# Patient Record
Sex: FEMALE
[De-identification: names, ages, dates, MRNs, and addresses within clinical notes are randomized per-mention and may not be internally consistent; named-entity substitution may affect disease eponyms.]

---

## 2017-10-03 ENCOUNTER — HOSPITAL ENCOUNTER (OUTPATIENT)
Dept: HOSPITAL 92 - TBSIIMAG | Age: 62
Discharge: HOME | End: 2017-10-03
Payer: COMMERCIAL

## 2017-10-03 DIAGNOSIS — R93.7: ICD-10-CM

## 2017-10-03 DIAGNOSIS — M51.26: ICD-10-CM

## 2017-10-03 DIAGNOSIS — M48.061: ICD-10-CM

## 2017-10-03 DIAGNOSIS — M54.9: Primary | ICD-10-CM

## 2017-10-03 PROCEDURE — 72148 MRI LUMBAR SPINE W/O DYE: CPT

## 2017-10-03 NOTE — MRI
NONCONTRAST LUMBAR SPINE MRI:

 

INDICATIONS:

Back pain.  Central canal stenosis.  Followup.

 

COMPARISON:

07/05/2012

 

FINDINGS:

Vertebral body heights maintain stable height and alignment.  Slight reversal of normal lumbar lordo
sis.  No acute marrow edema.  There is mild retrolisthesis at the L3-L4 level, stable.  There is deg
enerative disk signal involving L2-L3, L3-L4, and L5-S1.  The conus medullaris terminates at the L1 
level, normal in morphology.

 

L5-S1:  Mild disk bulge without central canal or foraminal stenosis.

 

L4-L5:  Broad-based disk bulge remains, producing mild to moderate central canal stenosis and crowdi
ng of the bilateral traversing L5 nerve roots.  No high grade left foraminal stenosis.  There is min
imal narrowing of the right neural foramen.

 

L3-L4:  There is a disk bulge with a superimposed central disk protrusion again demonstrated, with s
evere central canal stenosis, progressed from prior exam.  There is mild bilateral neural foraminal 
stenosis.

 

L2-L3:  Broad-based disk bulge with mild narrowing of the central canal.  No high grade foraminal st
enosis.

 

L1-L2:  No high grade central canal or neural foraminal stenosis.

 

There is multilevel mild to moderate bilateral facet osteoarthritis.

 

IMPRESSION:

Progression of central canal stenosis, now severe in degree, involving the L3-L4 level, due to centr
al disk protrusion, superimposed upon broad-based disk bulge.

 

POS: [facility]

## 2017-12-20 ENCOUNTER — HOSPITAL ENCOUNTER (OUTPATIENT)
Dept: HOSPITAL 92 - BICMAMMO | Age: 62
Discharge: HOME | End: 2017-12-20
Attending: NURSE PRACTITIONER
Payer: COMMERCIAL

## 2017-12-20 DIAGNOSIS — Z12.31: Primary | ICD-10-CM

## 2017-12-20 PROCEDURE — G0202 SCR MAMMO BI INCL CAD: HCPCS

## 2017-12-20 PROCEDURE — 77067 SCR MAMMO BI INCL CAD: CPT

## 2017-12-28 ENCOUNTER — HOSPITAL ENCOUNTER (OUTPATIENT)
Dept: HOSPITAL 92 - ULT | Age: 62
Discharge: HOME | End: 2017-12-28
Attending: INTERNAL MEDICINE
Payer: COMMERCIAL

## 2017-12-28 DIAGNOSIS — K76.0: ICD-10-CM

## 2017-12-28 DIAGNOSIS — K74.60: Primary | ICD-10-CM

## 2017-12-28 DIAGNOSIS — I70.90: ICD-10-CM

## 2017-12-28 DIAGNOSIS — M81.0: ICD-10-CM

## 2017-12-28 DIAGNOSIS — M47.816: ICD-10-CM

## 2017-12-28 LAB
ALP SERPL-CCNC: 191 U/L (ref 40–150)
ALT SERPL W P-5'-P-CCNC: 14 U/L (ref 8–55)
ANION GAP SERPL CALC-SCNC: 9 MMOL/L (ref 10–20)
APTT PPP: 32 SEC (ref 22.9–36.1)
AST SERPL-CCNC: 30 U/L (ref 5–34)
BASOPHILS # BLD AUTO: 0 THOU/UL (ref 0–0.2)
BASOPHILS NFR BLD AUTO: 0.6 % (ref 0–1)
BILIRUB DIRECT SERPL-MCNC: 0.4 MG/DL (ref 0.1–0.3)
BILIRUB SERPL-MCNC: 0.9 MG/DL (ref 0.2–1.2)
BUN SERPL-MCNC: 8 MG/DL (ref 9.8–20.1)
CALCIUM SERPL-MCNC: 9.3 MG/DL (ref 7.8–10.44)
CHLORIDE SERPL-SCNC: 111 MMOL/L (ref 98–107)
CO2 SERPL-SCNC: 25 MMOL/L (ref 23–31)
CREAT CL PREDICTED SERPL C-G-VRATE: 0 ML/MIN (ref 70–130)
EOSINOPHIL # BLD AUTO: 0.2 THOU/UL (ref 0–0.7)
EOSINOPHIL NFR BLD AUTO: 3.5 % (ref 0–10)
HCT VFR BLD CALC: 43 % (ref 36–47)
LYMPHOCYTES # BLD: 2 THOU/UL (ref 1.2–3.4)
LYMPHOCYTES NFR BLD AUTO: 41.6 % (ref 21–51)
MONOCYTES # BLD AUTO: 0.3 THOU/UL (ref 0.11–0.59)
MONOCYTES NFR BLD AUTO: 5.5 % (ref 0–10)
NEUTROPHILS # BLD AUTO: 2.4 THOU/UL (ref 1.4–6.5)
PROTHROMBIN TIME: 15.1 SEC (ref 12–14.7)
RBC # BLD AUTO: 4.64 MILL/UL (ref 4.2–5.4)
WBC # BLD AUTO: 4.9 THOU/UL (ref 4.8–10.8)

## 2017-12-28 PROCEDURE — 85610 PROTHROMBIN TIME: CPT

## 2017-12-28 PROCEDURE — 72120 X-RAY BEND ONLY L-S SPINE: CPT

## 2017-12-28 PROCEDURE — 36415 COLL VENOUS BLD VENIPUNCTURE: CPT

## 2017-12-28 PROCEDURE — 80076 HEPATIC FUNCTION PANEL: CPT

## 2017-12-28 PROCEDURE — 76705 ECHO EXAM OF ABDOMEN: CPT

## 2017-12-28 PROCEDURE — 80048 BASIC METABOLIC PNL TOTAL CA: CPT

## 2017-12-28 PROCEDURE — 82105 ALPHA-FETOPROTEIN SERUM: CPT

## 2017-12-28 PROCEDURE — 85730 THROMBOPLASTIN TIME PARTIAL: CPT

## 2017-12-28 PROCEDURE — 85025 COMPLETE CBC W/AUTO DIFF WBC: CPT

## 2017-12-28 NOTE — ULT
RIGHT UPPER QUADRANT ULTRASOUND: 

 

History: Cirrhosis. Right upper quadrant pain. 

 

FINDINGS: 

Gallbladder has a normal appearance without evidence of stones. Common duct is 0.4 cm diameter. Liver
 is diffusely echogenic. A cyst within the left liver lobe near the surface is 0.9 cm greatest diamet
er. A well circumscribed hypoechoic lesion within the left liver lobe may represent a complex cyst, m
easuring up to 1.4 cm. No free fluid is evident. 

 

IMPRESSION: 

1. No evidence of gallstones or biliary obstruction. 

2. Hepatosteatosis. 

 

POS: [facility]

## 2017-12-28 NOTE — RAD
LUMBAR SPINE FOUR VIEWS:

 

History: Low back pain. 

 

FINDINGS: 

There are five lumbar type vertebrae. Pedicles are intact. Vertebral body height are maintained. Ther
e is minimal degenerative retrolisthesis at the L3-4 level which does not change upon flexion or exte
nsion. Osteophytosis is present throughout the vertebral bodies and facets. Osseous structures are de
mineralized. There is calcification in the arterial structures. 

 

IMPRESSION: 

1. Lumbar spondylosis. No evidence of compression fracture. 

2. Osteoporosis. 

3. Atherosclerosis.

 

POS: [facility]

## 2018-06-22 ENCOUNTER — HOSPITAL ENCOUNTER (OUTPATIENT)
Dept: HOSPITAL 92 - BICULT | Age: 63
Discharge: HOME | End: 2018-06-22
Attending: INTERNAL MEDICINE
Payer: COMMERCIAL

## 2018-06-22 DIAGNOSIS — K74.60: Primary | ICD-10-CM

## 2018-06-22 PROCEDURE — 76705 ECHO EXAM OF ABDOMEN: CPT

## 2018-07-16 ENCOUNTER — HOSPITAL ENCOUNTER (OUTPATIENT)
Dept: HOSPITAL 92 - TBSIIMAG | Age: 63
Discharge: HOME | End: 2018-07-16
Attending: NEUROLOGICAL SURGERY
Payer: COMMERCIAL

## 2018-07-16 DIAGNOSIS — Z98.890: ICD-10-CM

## 2018-07-16 DIAGNOSIS — M54.16: Primary | ICD-10-CM

## 2018-07-16 DIAGNOSIS — M25.78: ICD-10-CM

## 2018-07-16 PROCEDURE — 72100 X-RAY EXAM L-S SPINE 2/3 VWS: CPT

## 2018-07-16 NOTE — RAD
TWO VIEWS LUMBAR SPINE:

 

History: Lumbar radiculopathy, M54.16.

 

FINDINGS/IMPRESSION: 

Two views of the lumbar spine demonstrate previous lumbar laminectomy changes involving the L3 and L4
 vertebral levels. The vertebral bodies are unremarkable. Some mild anterior osteophytes seen in the 
L2, L3, and L4 levels. 

 

No evidence of adam or retrolisthesis seen. No evidence of acute lumbar spine fracture is seen. 

 

 

 

POS: [facility]

## 2018-07-20 ENCOUNTER — HOSPITAL ENCOUNTER (OUTPATIENT)
Dept: HOSPITAL 92 - NM | Age: 63
Discharge: HOME | End: 2018-07-20
Attending: INTERNAL MEDICINE
Payer: COMMERCIAL

## 2018-07-20 DIAGNOSIS — K74.60: Primary | ICD-10-CM

## 2018-07-20 PROCEDURE — 78264 GASTRIC EMPTYING IMG STUDY: CPT

## 2018-07-20 PROCEDURE — A9541 TC99M SULFUR COLLOID: HCPCS

## 2018-07-20 NOTE — NM
NUCLEAR MEDICINE GASTRIC EMPTYING EXAM:

 

CLINICAL HISTORY: 

Unspecified sclerosis of liver and gastroparesis.

 

TECHNIQUE: 

1.8 mCi sulfur colloid administered in egg-based meal.

 

FINDINGS: 

Gastric emptying half-time is approximated at 103 minutes.  Examination is performed for 240 minutes 
at which time there is 61% emptying. 

 

IMPRESSION: 

1.  Gastric emptying half-time of 103 minutes.

 

2.  At termination of the exam, 240 minutes, there is 61% gastric emptying.

 

POS: [facility]

## 2018-10-04 ENCOUNTER — HOSPITAL ENCOUNTER (OUTPATIENT)
Dept: HOSPITAL 92 - TBSIIMAG | Age: 63
Discharge: HOME | End: 2018-10-04
Attending: NEUROLOGICAL SURGERY
Payer: COMMERCIAL

## 2018-10-04 DIAGNOSIS — M51.86: ICD-10-CM

## 2018-10-04 DIAGNOSIS — Z98.890: ICD-10-CM

## 2018-10-04 DIAGNOSIS — M54.5: Primary | ICD-10-CM

## 2018-10-04 PROCEDURE — 72120 X-RAY BEND ONLY L-S SPINE: CPT

## 2018-10-04 NOTE — RAD
FIVE VIEWS LUMBAR SPINE:

 

Date: 10-4-18 

 

History: Low back pain. Follow up back surgery. 

 

Comparison: 7-16-18

 

FINDINGS: 

Five non-rib bearing lumbar type vertebral bodies are again present. Laminectomy defects are again se
en at L3 and L4. Scattered osteophytes are seen within the lumbar spine. There is slight retrolisthes
is of L3 on L4, and the degree of retrolisthesis does not change between flexion and extension. No ad
ditional level of subluxation is seen. Vertebral body heights are within normal limits and there is n
o fracture identified. 

 

IMPRESSION: 

1. Post-surgical changes lumbar spine related to laminectomy defects at the L3 and L4 vertebral lg
s. 

2. Slight retrolisthesis of L3 on L4. 

3. No acute fracture visualized. 

4. Vascular calcifications. 

 

POS: [facility]